# Patient Record
Sex: FEMALE | Race: WHITE | Employment: OTHER | URBAN - METROPOLITAN AREA
[De-identification: names, ages, dates, MRNs, and addresses within clinical notes are randomized per-mention and may not be internally consistent; named-entity substitution may affect disease eponyms.]

---

## 2018-11-27 ENCOUNTER — HOSPITAL ENCOUNTER (EMERGENCY)
Facility: CLINIC | Age: 73
Discharge: HOME | End: 2018-11-27
Attending: EMERGENCY MEDICINE | Admitting: EMERGENCY MEDICINE
Payer: MEDICARE

## 2018-11-27 ENCOUNTER — APPOINTMENT (OUTPATIENT)
Dept: MRI IMAGING | Facility: CLINIC | Age: 73
End: 2018-11-27
Attending: EMERGENCY MEDICINE
Payer: MEDICARE

## 2018-11-27 VITALS
WEIGHT: 150 LBS | OXYGEN SATURATION: 96 % | DIASTOLIC BLOOD PRESSURE: 63 MMHG | SYSTOLIC BLOOD PRESSURE: 155 MMHG | HEIGHT: 70 IN | BODY MASS INDEX: 21.47 KG/M2 | RESPIRATION RATE: 16 BRPM | TEMPERATURE: 97.6 F

## 2018-11-27 DIAGNOSIS — H53.9 VISION CHANGES: ICD-10-CM

## 2018-11-27 LAB
ANION GAP SERPL CALCULATED.3IONS-SCNC: 7 MMOL/L (ref 3–14)
BASOPHILS # BLD AUTO: 0 10E9/L (ref 0–0.2)
BASOPHILS NFR BLD AUTO: 0.3 %
BUN SERPL-MCNC: 16 MG/DL (ref 7–30)
CALCIUM SERPL-MCNC: 9.2 MG/DL (ref 8.5–10.1)
CHLORIDE SERPL-SCNC: 102 MMOL/L (ref 94–109)
CO2 SERPL-SCNC: 30 MMOL/L (ref 20–32)
CREAT BLD-MCNC: 0.6 MG/DL (ref 0.52–1.04)
CREAT SERPL-MCNC: 0.5 MG/DL (ref 0.52–1.04)
DIFFERENTIAL METHOD BLD: NORMAL
EOSINOPHIL # BLD AUTO: 0.1 10E9/L (ref 0–0.7)
EOSINOPHIL NFR BLD AUTO: 2.2 %
ERYTHROCYTE [DISTWIDTH] IN BLOOD BY AUTOMATED COUNT: 13 % (ref 10–15)
GFR SERPL CREATININE-BSD FRML MDRD: >90 ML/MIN/1.7M2
GFR SERPL CREATININE-BSD FRML MDRD: >90 ML/MIN/1.7M2
GLUCOSE SERPL-MCNC: 89 MG/DL (ref 70–99)
HCT VFR BLD AUTO: 41.7 % (ref 35–47)
HGB BLD-MCNC: 14.3 G/DL (ref 11.7–15.7)
IMM GRANULOCYTES # BLD: 0 10E9/L (ref 0–0.4)
IMM GRANULOCYTES NFR BLD: 0.2 %
INTERPRETATION ECG - MUSE: NORMAL
LYMPHOCYTES # BLD AUTO: 2.5 10E9/L (ref 0.8–5.3)
LYMPHOCYTES NFR BLD AUTO: 38.2 %
MCH RBC QN AUTO: 31.1 PG (ref 26.5–33)
MCHC RBC AUTO-ENTMCNC: 34.3 G/DL (ref 31.5–36.5)
MCV RBC AUTO: 91 FL (ref 78–100)
MONOCYTES # BLD AUTO: 0.6 10E9/L (ref 0–1.3)
MONOCYTES NFR BLD AUTO: 8.6 %
NEUTROPHILS # BLD AUTO: 3.3 10E9/L (ref 1.6–8.3)
NEUTROPHILS NFR BLD AUTO: 50.5 %
NRBC # BLD AUTO: 0 10*3/UL
NRBC BLD AUTO-RTO: 0 /100
PLATELET # BLD AUTO: 315 10E9/L (ref 150–450)
POTASSIUM SERPL-SCNC: 3.3 MMOL/L (ref 3.4–5.3)
RBC # BLD AUTO: 4.6 10E12/L (ref 3.8–5.2)
SODIUM SERPL-SCNC: 139 MMOL/L (ref 133–144)
TROPONIN I SERPL-MCNC: <0.015 UG/L (ref 0–0.04)
WBC # BLD AUTO: 6.5 10E9/L (ref 4–11)

## 2018-11-27 PROCEDURE — 70544 MR ANGIOGRAPHY HEAD W/O DYE: CPT | Mod: XS

## 2018-11-27 PROCEDURE — 93005 ELECTROCARDIOGRAM TRACING: CPT

## 2018-11-27 PROCEDURE — 76536 US EXAM OF HEAD AND NECK: CPT

## 2018-11-27 PROCEDURE — 82565 ASSAY OF CREATININE: CPT

## 2018-11-27 PROCEDURE — 25500064 ZZH RX 255 OP 636: Performed by: EMERGENCY MEDICINE

## 2018-11-27 PROCEDURE — 70553 MRI BRAIN STEM W/O & W/DYE: CPT

## 2018-11-27 PROCEDURE — A9585 GADOBUTROL INJECTION: HCPCS | Performed by: EMERGENCY MEDICINE

## 2018-11-27 PROCEDURE — 80048 BASIC METABOLIC PNL TOTAL CA: CPT | Performed by: EMERGENCY MEDICINE

## 2018-11-27 PROCEDURE — 84484 ASSAY OF TROPONIN QUANT: CPT | Performed by: EMERGENCY MEDICINE

## 2018-11-27 PROCEDURE — 99285 EMERGENCY DEPT VISIT HI MDM: CPT | Mod: 25

## 2018-11-27 PROCEDURE — 85025 COMPLETE CBC W/AUTO DIFF WBC: CPT | Performed by: EMERGENCY MEDICINE

## 2018-11-27 RX ORDER — GADOBUTROL 604.72 MG/ML
6 INJECTION INTRAVENOUS ONCE
Status: COMPLETED | OUTPATIENT
Start: 2018-11-27 | End: 2018-11-27

## 2018-11-27 RX ADMIN — GADOBUTROL 6 ML: 604.72 INJECTION INTRAVENOUS at 20:52

## 2018-11-27 ASSESSMENT — ENCOUNTER SYMPTOMS
EYE PAIN: 0
NUMBNESS: 0

## 2018-11-27 NOTE — ED AVS SNAPSHOT
Emergency Department    [address] [facility] [address]    Phone:  [phone]    Fax:  [phone]                                       Ayesha Winn   MRN: [id number]    Department:   Emergency Department   Date of Visit:  11/27/2018           Patient Information     Date Of Birth          1945        Your diagnoses for this visit were:     Vision changes        You were seen by Venice Valentin MD.      Follow-up Information     Follow up with Kal Houston MD On 11/28/2018.    Specialty:  Ophthalmology    Why:  1p    Contact information:    [facility]  [address]  [address]  [facility] [id number] [phone]          Discharge Instructions         Blurred Vision  Blurred vision is when your vision is no longer sharp and you can t see small details. Any changes in your vision, whether sudden or over time, should be checked out by an eye specialist.  Vision changes can be caused by many things. These include eye diseases, side effects of some medicines, or a condition such as diabetes. Never ignore vision changes. People often think that vision changes occur because they need more powerful vision correction. Many people then delay seeing their healthcare provider about their vision changes. But it s risky to delay care. If left untreated, some eye problems can lead to lasting (permanent) vision loss that can t be corrected with glasses. This can significantly reduce quality of life.  Home care  Make changes in your home to reduce the risk of falling:    Keep walkways clear of objects you may trip over. Use nonslip pads under rugs.    Brighter lighting in your home may help you see better.    Don t walk in poorly lit areas.    Be careful when stepping up and down from curbs and walking on uneven sidewalks.  Follow-up care  Follow up with an eye specialist or as advised. There are two types of eye doctors you can consult:    Optometrist. This is a  Blood collected and sent to lab. The patient given a urine cup and placed in the lobby.   licensed doctor of optometry. Optometrists are not medical doctors. They specialize in eye exams and may diagnose some eye problems. They also prescribe glasses and contact lenses.    Ophthalmologist. This is a medical doctor who specializes in eye care. Ophthalmologists diagnose and treat all eye diseases, prescribe medicines, and perform eye surgery. They may also prescribe glasses and contact lenses.  An optometrist can provide a basic screening eye exam for much less cost than an ophthalmologist. The optometrist can tell you if your condition needs the services of an ophthalmologist.  When to seek medical advice  Call your healthcare provider right away if any of these occur:    Sudden change in your vision    Eye pain, redness, or discharge from your eyelid    Blurriness doesn t get better, or it gets worse    Dark spots in your field of vision    Halos around lights    Dots or strings (called floaters) moving across your field of vision    Sudden flash of light inside your eye    Vision dims    Part or full vision loss  Date Last Reviewed: 7/1/2017 2000-2018 [employer]. [address] [place] [address]. All rights reserved. This information is not intended as a substitute for professional medical care. Always follow your healthcare professional's instructions.            24 Hour Appointment Hotline       To make an appointment at any [facility], call [phone] ([phone]). If you don't have a family doctor or clinic, we will help you find one. [facility] clinics are conveniently located to serve the needs of you and your family.             Review of your medicines      Our records show that you are taking the medicines listed below. If these are incorrect, please call your family doctor or clinic.        Dose / Directions Last dose taken    ASPIRIN PO        Refills:  0        ATENOLOL PO        Refills:  0        COQ10 PO        Refills:  0        SIMVASTATIN PO         Refills:  0                Procedures and tests performed during your visit     Basic metabolic panel    CBC with platelets differential    Creatinine POCT    EKG 12-lead, tracing only    ISTAT creatinine nursing POCT    MR Brain w/o & w Contrast    MRA Brain ([place]) wo Contrast    POC US SOFT TISSUE    Troponin I      Orders Needing Specimen Collection     None      Pending Results     Date and Time Order Name Status Description    11/27/2018 2130 POC US SOFT TISSUE In process             Pending Culture Results     No orders found from 11/25/2018 to 11/28/2018.            Pending Results Instructions     If you had any lab results that were not finalized at the time of your Discharge, you can call the ED Lab Result RN at [phone]. You will be contacted by this team for any positive Lab results or changes in treatment. The nurses are available 7 days a week from 10A to 6:30P.  You can leave a message 24 hours per day and they will return your call.        Test Results From Your Hospital Stay        11/27/2018  6:29 PM      Component Results     Component Value Ref Range & Units Status    WBC 6.5 4.0 - 11.0 10e9/L Final    RBC Count 4.60 3.8 - 5.2 10e12/L Final    Hemoglobin 14.3 11.7 - 15.7 g/dL Final    Hematocrit 41.7 35.0 - 47.0 % Final    MCV 91 78 - 100 fl Final    MCH 31.1 26.5 - 33.0 pg Final    MCHC 34.3 31.5 - 36.5 g/dL Final    RDW 13.0 10.0 - 15.0 % Final    Platelet Count 315 150 - 450 10e9/L Final    Diff Method Automated Method  Final    % Neutrophils 50.5 % Final    % Lymphocytes 38.2 % Final    % Monocytes 8.6 % Final    % Eosinophils 2.2 % Final    % Basophils 0.3 % Final    % Immature Granulocytes 0.2 % Final    Nucleated RBCs 0 0 /100 Final    Absolute Neutrophil 3.3 1.6 - 8.3 10e9/L Final    Absolute Lymphocytes 2.5 0.8 - 5.3 10e9/L Final    Absolute Monocytes 0.6 0.0 - 1.3 10e9/L Final    Absolute Eosinophils 0.1 0.0 - 0.7 10e9/L Final    Absolute Basophils 0.0 0.0 - 0.2 10e9/L  Final    Abs Immature Granulocytes 0.0 0 - 0.4 10e9/L Final    Absolute Nucleated RBC 0.0  Final         11/27/2018  6:43 PM      Component Results     Component Value Ref Range & Units Status    Sodium 139 133 - 144 mmol/L Final    Potassium 3.3 (L) 3.4 - 5.3 mmol/L Final    Chloride 102 94 - 109 mmol/L Final    Carbon Dioxide 30 20 - 32 mmol/L Final    Anion Gap 7 3 - 14 mmol/L Final    Glucose 89 70 - 99 mg/dL Final    Urea Nitrogen 16 7 - 30 mg/dL Final    Creatinine 0.50 (L) 0.52 - 1.04 mg/dL Final    GFR Estimate >90 >60 mL/min/1.7m2 Final    Non  GFR Calc    GFR Estimate If Black >90 >60 mL/min/1.7m2 Final    African American GFR Calc    Calcium 9.2 8.5 - 10.1 mg/dL Final         11/27/2018  6:47 PM      Component Results     Component Value Ref Range & Units Status    Troponin I ES <0.015 0.000 - 0.045 ug/L Final    The 99th percentile for upper reference range is 0.045 ug/L.  Troponin values   in the range of 0.045 - 0.120 ug/L may be associated with risks of adverse   clinical events.           11/27/2018  8:48 PM      Narrative     MR ANGIOGRAM OF THE HEAD WITHOUT CONTRAST   11/27/2018 7:27 PM     HISTORY: Right-sided vision changes.    TECHNIQUE:  3D time-of-flight MR angiogram of the head without  contrast.    COMPARISON: None.    FINDINGS:  The visualized portions of the distal internal carotid and  vertebral arteries, the basilar artery, [place], and the  proximal anterior, middle and posterior cerebral arteries all appear  normal.  There is no evidence of aneurysm or vascular stenosis or  occlusion.  There is bilateral fetal origin posterior cerebral  arteries from the internal carotids, a normal variant accounting for  small size of the basilar and vertebral arteries. Right vertebral  artery ends in PICA as a normal variant.        Impression     IMPRESSION:  Normal MR angiogram of the head.  Normal variants as  described above.      SUMANTH RICHARDSON MD         11/27/2018   6:31 PM      Component Results     Component Value Ref Range & Units Status    Creatinine 0.6 0.52 - 1.04 mg/dL Final    GFR Estimate >90 >60 mL/min/1.7m2 Final    GFR Estimate If Black >90 >60 mL/min/1.7m2 Final         11/27/2018  9:23 PM      Narrative     MRI BRAIN WITHOUT AND WITH CONTRAST  11/27/2018 8:51 PM    HISTORY:  Acute right-sided vision changes.     TECHNIQUE:  Multiplanar, multisequence MRI of the brain without and  with 6 mL Gadavist.    COMPARISON: None.    FINDINGS:  The ventricles and subarachnoid spaces appear normal. There  are a few tiny foci of prolonged T2 relaxation in the central and  periventricular white matter of the frontal and the parietal lobes,  nonspecific as to etiology. There is no evidence of hemorrhage, mass,  acute infarct, or anomaly.  There are no gadolinium enhancing lesions.    The facial structures appear normal. The arteries at the base of the  brain and the dural venous sinuses appear patent.         Impression     IMPRESSION:  1. No acute abnormality.  2. Minimal nonspecific white matter T2 hyperintensities in the frontal  and parietal lobes. These could indicate sequelae of small vessel  ischemic disease or vascular headaches.      SUMANTH RICHARDSON MD         11/27/2018  9:30 PM      Result not yet available     Exam Begun                Clinical Quality Measure: Blood Pressure Screening     Your blood pressure was checked while you were in the emergency department today. The last reading we obtained was  BP: 160/72 . Please read the guidelines below about what these numbers mean and what you should do about them.  If your systolic blood pressure (the top number) is less than 120 and your diastolic blood pressure (the bottom number) is less than 80, then your blood pressure is normal. There is nothing more that you need to do about it.  If your systolic blood pressure (the top number) is 120-139 or your diastolic blood pressure (the bottom number) is 80-89, your blood  "pressure may be higher than it should be. You should have your blood pressure rechecked within a year by a primary care provider.  If your systolic blood pressure (the top number) is 140 or greater or your diastolic blood pressure (the bottom number) is 90 or greater, you may have high blood pressure. High blood pressure is treatable, but if left untreated over time it can put you at risk for heart attack, stroke, or kidney failure. You should have your blood pressure rechecked by a primary care provider within the next 4 weeks.  If your provider in the emergency department today gave you specific instructions to follow-up with your doctor or provider even sooner than that, you should follow that instruction and not wait for up to 4 weeks for your follow-up visit.        Thank you for choosing [facility]       Thank you for choosing [facility] for your care. Our goal is always to provide you with excellent care. Hearing back from our patients is one way we can continue to improve our services. Please take a few minutes to complete the written survey that you may receive in the mail after you visit with us. Thank you!        [facility] Information     [facility] lets you send messages to your doctor, view your test results, renew your prescriptions, schedule appointments and more. To sign up, go to [email or web address] . Click on \"Log in\" on the left side of the screen, which will take you to the Welcome page. Then click on \"Sign up Now\" on the right side of the page.     You will be asked to enter the access code listed below, as well as some personal information. Please follow the directions to create your username and password.     Your access code is: [email or web address]  Expires: 2019 10:21 PM     Your access code will  in 90 days. If you need help or a new code, please call your [facility] clinic or [phone].        Care EveryWhere ID     This is your Care EveryWhere ID. This could be used by other organizations " to access your [facility] medical records  [id number]        Equal Access to Services     JEANNIE MAE : Andrea Huerta, keron mcqueen, yoon swartz. [facility] [phone].    ATENCIÓN: Si habla español, tiene a mahoney disposición servicios gratuitos de asistencia lingüística. Llame al [phone].    We comply with applicable federal civil rights laws and [place] laws. We do not discriminate on the basis of race, color, national origin, age, disability, sex, sexual orientation, or gender identity.            After Visit Summary       This is your record. Keep this with you and show to your community pharmacist(s) and doctor(s) at your next visit.

## 2018-11-27 NOTE — ED NOTES
Bed: ED29  Expected date:   Expected time:   Means of arrival:   Comments:  Triage vision loss when clean

## 2018-11-27 NOTE — ED AVS SNAPSHOT
Emergency Department    [address] [place] [address]    Phone:  [phone]    Fax:  [phone]                                       Ayesha Winn   MRN: [id number]    Department:   Emergency Department   Date of Visit:  11/27/2018           After Visit Summary Signature Page     I have received my discharge instructions, and my questions have been answered. I have discussed any challenges I see with this plan with the nurse or doctor.    ..........................................................................................................................................  Patient/Patient Representative Signature      ..........................................................................................................................................  Patient Representative Print Name and Relationship to Patient    ..................................................               ................................................  Date                                   Time    ..........................................................................................................................................  Reviewed by Signature/Title    ...................................................              ..............................................  Date                                               Time          22EPIC Rev 08/18

## 2018-11-28 NOTE — DISCHARGE INSTRUCTIONS
Blurred Vision  Blurred vision is when your vision is no longer sharp and you can t see small details. Any changes in your vision, whether sudden or over time, should be checked out by an eye specialist.  Vision changes can be caused by many things. These include eye diseases, side effects of some medicines, or a condition such as diabetes. Never ignore vision changes. People often think that vision changes occur because they need more powerful vision correction. Many people then delay seeing their healthcare provider about their vision changes. But it s risky to delay care. If left untreated, some eye problems can lead to lasting (permanent) vision loss that can t be corrected with glasses. This can significantly reduce quality of life.  Home care  Make changes in your home to reduce the risk of falling:    Keep walkways clear of objects you may trip over. Use nonslip pads under rugs.    Brighter lighting in your home may help you see better.    Don t walk in poorly lit areas.    Be careful when stepping up and down from curbs and walking on uneven sidewalks.  Follow-up care  Follow up with an eye specialist or as advised. There are two types of eye doctors you can consult:    Optometrist. This is a licensed doctor of optometry. Optometrists are not medical doctors. They specialize in eye exams and may diagnose some eye problems. They also prescribe glasses and contact lenses.    Ophthalmologist. This is a medical doctor who specializes in eye care. Ophthalmologists diagnose and treat all eye diseases, prescribe medicines, and perform eye surgery. They may also prescribe glasses and contact lenses.  An optometrist can provide a basic screening eye exam for much less cost than an ophthalmologist. The optometrist can tell you if your condition needs the services of an ophthalmologist.  When to seek medical advice  Call your healthcare provider right away if any of these occur:    Sudden change in your vision    Eye  pain, redness, or discharge from your eyelid    Blurriness doesn t get better, or it gets worse    Dark spots in your field of vision    Halos around lights    Dots or strings (called floaters) moving across your field of vision    Sudden flash of light inside your eye    Vision dims    Part or full vision loss  Date Last Reviewed: 7/1/2017 2000-2018 [employer]. [address] [place] [address]. All rights reserved. This information is not intended as a substitute for professional medical care. Always follow your healthcare professional's instructions.

## 2018-11-28 NOTE — ED NOTES
"I assumed care of pt at this time.    Report: Pt here with right sided intermittent vision loss. Pt currently in MRI.    To be completed: none    Current Vitals: /75  Temp 97.6  F (36.4  C) (Oral)  Resp 16  Ht 1.765 m (5' 9.5\")  Wt 68 kg (150 lb)  SpO2 98%  BMI 21.83 kg/m2    Disposition: Possible discharge.              "

## 2018-11-28 NOTE — ED PROVIDER NOTES
"  History     Chief Complaint:  Vision Loss Right Eye (started at 1700 at tonight and has \"spider web like vision and blurriness\" denies h/a )    KIERSTEN Winn is a 73 year old female, who presents to the ED for the evaluation of vision loss right eye. The patient reports that slightly over one hour ago she started to see 'black spider webs' in her right eye, prompting her to the ED. The patient states that now the spots in her right have started to fade, but notes now that the vision out of her right eye is \"foggy.\" She also notes that a similar episode occurred five days ago, but states that her symptoms went away and she did not have foggy vision afterwards. The patient denies a history of eye issues, pain from the eye, and numbness or tingling in her arms. She denies other neuro sxs.    Allergies:  No known drug allergies     Medications:    Aspirin  Atenolol  Coenzyme  Simvastatin    Past Medical History:    Hypercholesteremia  PVC's    Past Surgical History:    History reviewed. No pertinent surgical history.    Family History:    History reviewed. No pertinent family history.     Social History:  Smoking status: Never Smoker  Alcohol use: No  Marital Status:   [2]     Review of Systems   Eyes: Positive for visual disturbance. Negative for pain.        Film of 'black spider webs' and blurriness    Neurological: Negative for numbness.     Physical Exam   Patient Vitals for the past 24 hrs:   BP Temp Temp src Heart Rate Resp SpO2 Height Weight   11/27/18 2130 160/72 - - - - 97 % - -   11/27/18 2115 - - - - - 96 % - -   11/27/18 2100 - - - - - 96 % - -   11/27/18 2000 158/75 - - - - 96 % - -   11/27/18 1945 - - - - - 96 % - -   11/27/18 1933 174/74 - - 66 - 97 % - -   11/27/18 1930 - - - - - 95 % - -   11/27/18 1756 188/75 97.6  F (36.4  C) Oral 61 16 98 % 1.765 m (5' 9.5\") 68 kg (150 lb)       Physical Exam  General/Appearance: appears stated age, well-groomed, appears comfortable  EYE:  EOMI, " PERRL, nl sclera, no conjunctival hemorrhage, lids nl, no FB on eyelid eversion  Fundoscopic - No swelling/blurring of optic disc margins, no papilledema  Slit Lamp - ant chamber nl without cell or flare, no hyphema, no hypopyon  Visual Acuity - R 20/100, L 20/70  ENT: MMM  Neck: supple, nl ROM, no stiffness  Cardiovascular: RRR, nl S1S2, no m/r/g, 2+ pulses in all 4 extremities, cap refill <2sec  Respiratory: CTAB, good air movement throughout, no wheezes/rhonchi/rales, no increased WOB, no retractions  Back: no lesions  GI: abd soft, non-distended, nttp,  no HSM, no rebound, no guarding, nl BS  MSK: CANDELARIA, good tone, no bony abnormality  Skin: warm and well-perfused, no rash, no edema, no ecchymosis, nl turgor  Neuro: GCS 15, alert and oriented, no gross focal neuro deficits  Psych: interacts appropriately  Heme: no petechia, no purpura, no active bleeding    Emergency Department Course   ECG (18:38:40):  Rate 64 bpm. WY interval 176. QRS duration 84. QT/QTc 454/468. P-R-T axes 72 59 93. Normal sinus rhythm. ST and T wave abnormality, consider inferolateral ischemia. Abnormal ECG. Interpreted at 1850 by Venice Valentin MD.    Imaging:  Radiographic findings were communicated with the patient and family who voiced understanding of the findings.  MRA Brain ([place]) w/o Contrast  IMPRESSION:  Normal MR angiogram of the head.  Normal variants as  described above.  As read by Radiology.    MR Brain w/o and w Contrast  IMPRESSION:  1. No acute abnormality.  2. Minimal nonspecific white matter T2 hyperintensities in the frontal  and parietal lobes. These could indicate sequelae of small vessel  ischemic disease or vascular headaches.  As read by Radiology.    POC US Soft Tissue  WNL  Bello Keane MD    Laboratory:  CBC: WNL (WBC 6.5, HGB 14.3, )  BMP: Creatinine 0.50 (L), Potassium 3.3 (L)  Troponin (1819): <0.015  Creatinine POCT (1821): 0.6    Interventions:  2052, Gadavist, 6 mL, IV    Emergency  "Department Course:  Past medical records, nursing notes, and vitals reviewed.  1804: I performed an exam of the patient and obtained history, as documented above.    IV inserted and blood drawn.    The patient was sent for a brain MRA and MR while in the emergency department, findings above.    2129: I rechecked the patient. Explained findings to patient and spouse.    2135: I spoke with Dr. Houston of Opthalmology.    2004: I spoke with Dr. Houston of Opthalmology.    2214: I rechecked the patient. Findings and plan explained to the Patient. Patient discharged home with instructions regarding supportive care, medications, and reasons to return. The importance of close follow-up was reviewed.     Impression & Plan    Medical Decision Making:  This patient is a 73-year-old female who presents with sudden onset of visual symptoms.  She notes \"foggy\" vision in her right eye as well as diffuse \"spider webs.\"  There are no floaters, there is no pain, there is no photophobia.  Visual acuity is down but is abnormal in both eyes.  Slit-lamp was within normal limits suggesting no anterior chamber abnormality.  Funduscopic exam, although limited, was normal.  Bedside ultrasound did not show any large retinal detachment.  I also considered stroke but MRI/MRA were within normal limits.  I did speak with the ophthalmologist who felt this is likely posterior vitreous detachment.  She has an appointment to see him tomorrow at 1 PM.    Diagnosis:    ICD-10-CM    1. Vision changes H53.9        Disposition:  discharged to home    Abbe Paul  11/27/2018    EMERGENCY DEPARTMENT  Scribe Disclosure:  I, Abbe Paul, am serving as a scribe at 6:04 PM on 11/27/2018 to document services personally performed by Venice Valentin MD based on my observations and the provider's statements to me.      Venice Valentin MD  11/27/18 9589    "

## 2021-03-06 ENCOUNTER — HEALTH MAINTENANCE LETTER (OUTPATIENT)
Age: 76
End: 2021-03-06

## 2021-10-09 ENCOUNTER — HEALTH MAINTENANCE LETTER (OUTPATIENT)
Age: 76
End: 2021-10-09

## 2022-03-20 ENCOUNTER — HEALTH MAINTENANCE LETTER (OUTPATIENT)
Age: 77
End: 2022-03-20

## 2022-09-11 ENCOUNTER — HEALTH MAINTENANCE LETTER (OUTPATIENT)
Age: 77
End: 2022-09-11

## 2023-05-06 ENCOUNTER — HEALTH MAINTENANCE LETTER (OUTPATIENT)
Age: 78
End: 2023-05-06